# Patient Record
Sex: MALE | Race: WHITE
[De-identification: names, ages, dates, MRNs, and addresses within clinical notes are randomized per-mention and may not be internally consistent; named-entity substitution may affect disease eponyms.]

---

## 2020-02-08 ENCOUNTER — HOSPITAL ENCOUNTER (EMERGENCY)
Dept: HOSPITAL 53 - M ED | Age: 35
LOS: 1 days | Discharge: PSYCHIATRIC OR SUBSTANCE USE FACILITY | End: 2020-02-09
Payer: MEDICAID

## 2020-02-08 DIAGNOSIS — Z79.899: ICD-10-CM

## 2020-02-08 DIAGNOSIS — F33.9: ICD-10-CM

## 2020-02-08 DIAGNOSIS — K21.9: ICD-10-CM

## 2020-02-08 DIAGNOSIS — R45.851: Primary | ICD-10-CM

## 2020-02-08 DIAGNOSIS — I10: ICD-10-CM

## 2020-02-08 LAB
ALBUMIN SERPL BCG-MCNC: 4 GM/DL (ref 3.2–5.2)
ALT SERPL W P-5'-P-CCNC: 26 U/L (ref 12–78)
AMPHETAMINES UR QL SCN: NEGATIVE
APAP SERPL-MCNC: < 2 UG/ML (ref 10–30)
BARBITURATES UR QL SCN: NEGATIVE
BENZODIAZ UR QL SCN: POSITIVE
BILIRUB CONJ SERPL-MCNC: 0.2 MG/DL (ref 0–0.2)
BILIRUB SERPL-MCNC: 0.5 MG/DL (ref 0.2–1)
BUN SERPL-MCNC: 8 MG/DL (ref 7–18)
BZE UR QL SCN: NEGATIVE
CALCIUM SERPL-MCNC: 8.7 MG/DL (ref 8.5–10.1)
CANNABINOIDS UR QL SCN: NEGATIVE
CHLORIDE SERPL-SCNC: 104 MEQ/L (ref 98–107)
CO2 SERPL-SCNC: 29 MEQ/L (ref 21–32)
CREAT SERPL-MCNC: 0.8 MG/DL (ref 0.7–1.3)
ETHANOL SERPL-MCNC: 0.03 % (ref 0–0.01)
GFR SERPL CREATININE-BSD FRML MDRD: > 60 ML/MIN/{1.73_M2} (ref 60–?)
GLUCOSE SERPL-MCNC: 83 MG/DL (ref 70–100)
HCT VFR BLD AUTO: 47.2 % (ref 42–52)
HGB BLD-MCNC: 15.7 G/DL (ref 13.5–17.5)
MCH RBC QN AUTO: 29.2 PG (ref 27–33)
MCHC RBC AUTO-ENTMCNC: 33.3 G/DL (ref 32–36.5)
MCV RBC AUTO: 87.9 FL (ref 80–96)
METHADONE UR QL SCN: NEGATIVE
OPIATES UR QL SCN: NEGATIVE
PCP UR QL SCN: NEGATIVE
PLATELET # BLD AUTO: 220 10^3/UL (ref 150–450)
POTASSIUM SERPL-SCNC: 3.8 MEQ/L (ref 3.5–5.1)
PROT SERPL-MCNC: 6.9 GM/DL (ref 6.4–8.2)
RBC # BLD AUTO: 5.37 10^6/UL (ref 4.3–6.1)
SALICYLATES SERPL-MCNC: < 1.7 MG/DL (ref 5–30)
SODIUM SERPL-SCNC: 140 MEQ/L (ref 136–145)
TSH SERPL DL<=0.005 MIU/L-ACNC: 0.74 UIU/ML (ref 0.36–3.74)
WBC # BLD AUTO: 7.7 10^3/UL (ref 4–10)

## 2020-02-08 PROCEDURE — 85027 COMPLETE CBC AUTOMATED: CPT

## 2020-02-08 PROCEDURE — 80048 BASIC METABOLIC PNL TOTAL CA: CPT

## 2020-02-08 PROCEDURE — 84443 ASSAY THYROID STIM HORMONE: CPT

## 2020-02-08 PROCEDURE — 82375 ASSAY CARBOXYHB QUANT: CPT

## 2020-02-08 PROCEDURE — 36415 COLL VENOUS BLD VENIPUNCTURE: CPT

## 2020-02-08 PROCEDURE — 80076 HEPATIC FUNCTION PANEL: CPT

## 2020-02-08 PROCEDURE — 93005 ELECTROCARDIOGRAM TRACING: CPT

## 2020-02-08 PROCEDURE — 99284 EMERGENCY DEPT VISIT MOD MDM: CPT

## 2020-02-08 PROCEDURE — 80307 DRUG TEST PRSMV CHEM ANLYZR: CPT

## 2020-02-09 VITALS — DIASTOLIC BLOOD PRESSURE: 62 MMHG | SYSTOLIC BLOOD PRESSURE: 138 MMHG

## 2020-02-09 NOTE — ECGEPIP
[facility] - ED

                                       

                                       Test Date:    2020

Pat Name:     ADDY MCCANN          Department:   

Patient ID:   [id number]                 Room:         -

Gender:       Male                     Technician:   eleno

:          1985               Requested By: ELIZABETH GODINEZ 

Order Number: [id number]     Reading MD:   Maja Lundborg-Gray

                                 Measurements

Intervals                              Axis          

Rate:         61                       P:            52

DE:           168                      QRS:          79

QRSD:         110                      T:            38

QT:           401                                    

QTc:          406                                    

                           Interpretive Statements

SINUS RHYTHM

NONSPECIFIC ST T WAVE CHANGES

Electronically Signed on 2020 6:21:26 EST by Maja Lundborg-Gray